# Patient Record
Sex: MALE | Race: WHITE | NOT HISPANIC OR LATINO | URBAN - METROPOLITAN AREA
[De-identification: names, ages, dates, MRNs, and addresses within clinical notes are randomized per-mention and may not be internally consistent; named-entity substitution may affect disease eponyms.]

---

## 2022-09-15 ENCOUNTER — HOSPITAL ENCOUNTER (OUTPATIENT)
Dept: TELEMEDICINE | Facility: HOSPITAL | Age: 52
Discharge: HOME | End: 2022-09-15

## 2022-09-15 DIAGNOSIS — G93.40 ACUTE ENCEPHALOPATHY: ICD-10-CM

## 2022-09-15 PROCEDURE — G0425 INPT/ED TELECONSULT30: HCPCS | Mod: GT,,, | Performed by: STUDENT IN AN ORGANIZED HEALTH CARE EDUCATION/TRAINING PROGRAM

## 2022-09-15 PROCEDURE — G0425 PR INPT TELEHEALTH CONSULT 30M: ICD-10-PCS | Mod: GT,,, | Performed by: STUDENT IN AN ORGANIZED HEALTH CARE EDUCATION/TRAINING PROGRAM

## 2022-09-15 NOTE — ASSESSMENT & PLAN NOTE
"50 yo male w/ past medical history of HTN, HLD, recently started on new antiHTNsive medication presents w/ episode of LOC and AMS. 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   States that he was fine during the meeting prior to that. He had a syncopal episode and was down for about 30 seconds as per his boss, who is at bedside.   States he felt generalized weakness after and was slow to respond, but has continuously improved since then.   Denies previous episodes such as this.   Denies any other illnesses, abnormalities in the days leading up to this.     NIHSS 0. Not a tPA candidate and as he is back to baseline after a generalized episode of weakness/LOC and subsequent transient AMS low suspicion for LVO.     Recommend admission for a syncopal work up at this time.   Consider CTA H/N as well as cardiac work up.   "

## 2022-09-15 NOTE — CONSULTS
"      [facility]  Vascular Neurology  Comprehensive Stroke Center  TeleVascular Neurology Acute Consultation Note      Consults    Consulting Provider: INA MCCORMICK  Current Providers  No providers found    Patient Location: [facility] Emergency Department  Spoke hospital nurse at bedside with patient assisting consultant.     Patient information was obtained from patient and patient's employer .         Assessment/Plan:       Diagnoses:   * Acute encephalopathy  52 yo male w/ past medical history of HTN, HLD, recently started on new antiHTNsive medication presents w/ episode of LOC and AMS. 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   States that he was fine during the meeting prior to that. He had a syncopal episode and was down for about 30 seconds as per his boss, who is at bedside.   States he felt generalized weakness after and was slow to respond, but has continuously improved since then.   Denies previous episodes such as this.   Denies any other illnesses, abnormalities in the days leading up to this.     NIHSS 0. Not a tPA candidate and as he is back to baseline after a generalized episode of weakness/LOC and subsequent transient AMS low suspicion for LVO.     Recommend admission for a syncopal work up at this time.   Consider CTA H/N as well as cardiac work up.         STROKE DOCUMENTATION     Acute Stroke Times:   Acute Stroke Times   Last Known Normal Date: 09/15/22  Last Known Normal Time: 0700  Symptom Onset Date: 09/15/22  Symptom Onset Time: 0700  Stroke Team Called Date: 09/15/22  Stroke Team Called Time: 0855  Stroke Team Arrival Date: 09/15/22  Stroke Team Arrival Time: 0906  CT Interpretation Time: 0913 (As per ED staff)  CT completed but images not available for review - spoke to document results:  (As per ED physician, normal " "[facility])  Alteplase Recommended: No    NIH Scale:  1a. Level of Consciousness: 0-->Alert, keenly responsive  1b. LOC Questions: 0-->Answers both questions correctly  1c. LOC Commands: 0-->Performs both tasks correctly  2. Best Gaze: 0-->Normal  3. Visual: 0-->No visual loss  4. Facial Palsy: 0-->Normal symmetrical movements  5a. Motor Arm, Left: 0-->No drift, limb holds 90 (or 45) degrees for full 10 secs  5b. Motor Arm, Right: 0-->No drift, limb holds 90 (or 45) degrees for full 10 secs  6a. Motor Leg, Left: 0-->No drift, leg holds 30 degree position for full 5 secs  6b. Motor Leg, Right: 0-->No drift, leg holds 30 degree position for full 5 secs  7. Limb Ataxia: 0-->Absent  8. Sensory: 0-->Normal, no sensory loss  9. Best Language: 0-->No aphasia, normal  10. Dysarthria: 0-->Normal  11. Extinction and Inattention (formerly Neglect): 0-->No abnormality  Total (NIH Stroke Scale): 0     Modified [place]    [place] Coma Scale:    ABCD2 Score:    [id number] Score:   HAS -BLED Score:   ICH Score:   Hunt & Han Classification:       There were no vitals taken for this visit.  Alteplase Eligible?: No  Alteplase Recommendation: Alteplase not recommended due to Patient back to neurological baseline  Possible Interventional Revascularization Candidate? No; at this time symptoms not suggestive of large vessel occlusion    Disposition Recommendation: admit to inpatient    Subjective:     History of Present Illness:  52 yo male w/ past medical history of HTN, HLD, recently started on new antiHTNsive medication presents w/ episode of LOC and AMS. 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   States that he was fine during the meeting prior to that. He had a syncopal episode and was down for about 30 seconds as per his boss, who is at bedside.   States he felt generalized weakness after and was slow to respond, but has continuously improved since then.   Denies previous " episodes such as this.   Denies any other illnesses, abnormalities in the days leading up to this.       Woke up with symptoms?: no    Recent bleeding noted: no  Does the patient take any Blood Thinners? no  Medications: No relevant medications      Past Medical History: hypertension and hyperlipidemia    Past Surgical History: none    Family History: no relevant history    Social History: no smoking, no drinking, no drugs    Allergies: Allergies have not been reviewed No relevant allergies    Review of Systems   Constitutional: Positive for fatigue. Negative for diaphoresis.   HENT: Negative.  Negative for trouble swallowing.    Eyes: Negative.  Negative for pain.   Respiratory: Negative.  Negative for shortness of breath.    Cardiovascular: Negative.    Gastrointestinal: Negative.    Endocrine: Negative.    Genitourinary: Negative.    Musculoskeletal: Negative.    Skin: Negative.    Allergic/Immunologic: Negative.    Neurological: Negative.  Negative for speech difficulty.   Hematological: Negative.    Psychiatric/Behavioral: Positive for confusion.     Objective:   Vitals: There were no vitals taken for this visit. BP: 176/14, Respiratory Rate: 16 and Heart Rate: 55    CT READ: No    Physical Exam  Constitutional:       Appearance: Normal appearance. He is obese.   HENT:      Head: Normocephalic and atraumatic.   Eyes:      Extraocular Movements: Extraocular movements intact.   Cardiovascular:      Rate and Rhythm: Bradycardia present.      Pulses: Normal pulses.   Musculoskeletal:         General: Normal range of motion.      Cervical back: Normal range of motion.   Neurological:      General: No focal deficit present.      Mental Status: He is alert and oriented to person, place, and time.      Comments: Awake, A&Ox3, speech fluent, follows commands, no neglect  Normal speech, appropriate response to questions  EOM intact.   Facial expression is full and symmetric, no dysarthria  Tongue is midline   Moves all 4  extremities against gravity,  no arm or leg drift  Sensation intact to LT   Coordination (Finger to nose) is normal bilaterally, no ataxia noted               Recommended the emergency room physician to have a brief discussion with the patient and/or family if available regarding the  risks and benefits of treatment, and to briefly document the occurrence of that discussion in his clinical encounter note.     The attending portion of this evaluation, treatment, and documentation was performed per Violeta Baird MD via audiovisual.    Billing code:  (non-intervention mild to moderate stroke, TIA, some mimics)      This patient has a critical neurological condition/illness, with some potential for high morbidity and mortality.  There is a moderate probability for acute neurological change leading to clinical and possibly life-threatening deterioration requiring highest level of physician preparedness for urgent intervention.  Care was coordinated with other physicians involved in the patient's care.  Radiologic studies and laboratory data were reviewed and interpreted, and plan of care was re-assessed based on the results.  Diagnosis, treatment options and prognosis may have been discussed with the patient and/or family members or caregiver.    In your opinion, this was a: Tier 2 Van Negative    Consult End Time: 9:54 AM     Violeta Baird MD  Comprehensive Stroke Center  Vascular Neurology   [facility]

## 2022-09-15 NOTE — SUBJECTIVE & OBJECTIVE
Woke up with symptoms?: no    Recent bleeding noted: no  Does the patient take any Blood Thinners? no  Medications: No relevant medications      Past Medical History: hypertension and hyperlipidemia    Past Surgical History: none    Family History: no relevant history    Social History: no smoking, no drinking, no drugs    Allergies: Allergies have not been reviewed No relevant allergies    Review of Systems   Constitutional: Positive for fatigue. Negative for diaphoresis.   HENT: Negative.  Negative for trouble swallowing.    Eyes: Negative.  Negative for pain.   Respiratory: Negative.  Negative for shortness of breath.    Cardiovascular: Negative.    Gastrointestinal: Negative.    Endocrine: Negative.    Genitourinary: Negative.    Musculoskeletal: Negative.    Skin: Negative.    Allergic/Immunologic: Negative.    Neurological: Negative.  Negative for speech difficulty.   Hematological: Negative.    Psychiatric/Behavioral: Positive for confusion.     Objective:   Vitals: There were no vitals taken for this visit. BP: 176/14, Respiratory Rate: 16 and Heart Rate: 55    CT READ: No    Physical Exam  Constitutional:       Appearance: Normal appearance. He is obese.   HENT:      Head: Normocephalic and atraumatic.   Eyes:      Extraocular Movements: Extraocular movements intact.   Cardiovascular:      Rate and Rhythm: Bradycardia present.      Pulses: Normal pulses.   Musculoskeletal:         General: Normal range of motion.      Cervical back: Normal range of motion.   Neurological:      General: No focal deficit present.      Mental Status: He is alert and oriented to person, place, and time.      Comments: Awake, A&Ox3, speech fluent, follows commands, no neglect  Normal speech, appropriate response to questions  EOM intact.   Facial expression is full and symmetric, no dysarthria  Tongue is midline   Moves all 4 extremities against gravity,  no arm or leg drift  Sensation intact to LT   Coordination (Finger to  nose) is normal bilaterally, no ataxia noted

## 2022-09-15 NOTE — HPI
"52 yo male w/ past medical history of HTN, HLD, recently started on new antiHTNsive medication presents w/ episode of LOC and AMS. 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   States that he was fine during the meeting prior to that. He had a syncopal episode and was down for about 30 seconds as per his boss, who is at bedside.   States he felt generalized weakness after and was slow to respond, but has continuously improved since then.   Denies previous episodes such as this.   Denies any other illnesses, abnormalities in the days leading up to this.   "